# Patient Record
Sex: MALE | Race: WHITE | NOT HISPANIC OR LATINO | URBAN - METROPOLITAN AREA
[De-identification: names, ages, dates, MRNs, and addresses within clinical notes are randomized per-mention and may not be internally consistent; named-entity substitution may affect disease eponyms.]

---

## 2021-03-03 NOTE — IMPRESSION/PLAN
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 Baseline VF 24-2 & photos & OCT done today. Plan: epithelial-myoepithelial carcinoma. Discussed with pt- he has had multiple surgeries on the orbit and skull. has had the tumor removed and biopsied. Will continue to monitor the health of the eye while he is starting radiation down in [place].  Radiation done in April 2022

## 2021-03-03 NOTE — IMPRESSION/PLAN
Impression: Combined forms of age-related cataract, bilateral: H25.813. Plan: moderate cataracts, OD>OS due to multiple surgeries on the orbit due to cancer.

## 2021-07-01 NOTE — IMPRESSION/PLAN
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 Due to it taking up to a 1-2 years for signs to show if any damage to the optic nerve. OD>OS 

VF ordered today- shows stable no changes. Plan: Discussed with pt- he has had multiple surgeries on the orbit and skull. has had the tumor removed and biopsied. Will continue to monitor the health of the eye while he is starting radiation down in [place].  Radiation done in April 2022

## 2021-11-29 NOTE — IMPRESSION/PLAN
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 Due to it taking up to a 1-2 years for signs to show if any damage to the optic nerve. OD>OS. Discussed with pt- he has had multiple surgeries on the orbit and skull. has had the tumor removed and biopsied. Will continue to monitor the health of the eye while he is starting radiation down in [place]. Radiation done in April 2022 Plan: OCT ordered & reviewed- stable, no progression.

## 2022-04-20 NOTE — IMPRESSION/PLAN
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 Due to it taking up to a 1-2 years for signs to show if any damage to the optic nerve. OD>OS. Plan: OCT ordered & reviewed- stable, no progression.

## 2022-04-20 NOTE — IMPRESSION/PLAN
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 Plan: OCT ordered. Discussed this is the reason for the vision loss. Will refer him to retina for more options.

## 2022-04-20 NOTE — IMPRESSION/PLAN
Impression: Macular cyst, hole, or pseudohole, right eye: H35.341. Plan: OCT ordered- new cyst shown in the macula. Discussed with patient in detail.

## 2022-05-03 ENCOUNTER — OFFICE VISIT (OUTPATIENT)
Dept: URBAN - METROPOLITAN AREA CLINIC 68 | Facility: CLINIC | Age: 72
End: 2022-05-03
Payer: MEDICARE

## 2022-05-03 DIAGNOSIS — H25.10 AGE-RELATED NUCLEAR CATARACT, UNSPECIFIED EYE: ICD-10-CM

## 2022-05-03 DIAGNOSIS — H35.341 MACULAR CYST, HOLE, OR PSEUDOHOLE, RIGHT EYE: ICD-10-CM

## 2022-05-03 DIAGNOSIS — H04.121 DRY EYE SYNDROME OF RIGHT LACRIMAL GLAND: ICD-10-CM

## 2022-05-03 PROCEDURE — 92134 CPTRZ OPH DX IMG PST SGM RTA: CPT | Performed by: OPHTHALMOLOGY

## 2022-05-03 PROCEDURE — 99204 OFFICE O/P NEW MOD 45 MIN: CPT | Performed by: OPHTHALMOLOGY

## 2022-05-03 PROCEDURE — 67210 TREATMENT OF RETINAL LESION: CPT | Performed by: OPHTHALMOLOGY

## 2022-05-03 ASSESSMENT — INTRAOCULAR PRESSURE
OS: 15
OD: 14

## 2022-05-03 NOTE — IMPRESSION/PLAN
Impression: h/o Retinal hole, OS
s/p Laser Retinopexy last 05/24/13 DYK Plan: No new symptoms. Exam and OCT reveal no ERM. Good laser treatment at 1 oclock OS. Observe. RDW.

## 2022-05-03 NOTE — IMPRESSION/PLAN
Impression: BRVO, OS. Plan: Exam and OCT reveal CME OS (365 microns). Recommend Avastin series with focal. 
RBA discussed. The patient elects focal OS. Thanks, Jemma Dorado. 

Return in 1 week for Avastin OS, and in 1 month for re-eval CME, OCT OU, IVFA OS 1st

## 2022-05-10 ENCOUNTER — OFFICE VISIT (OUTPATIENT)
Dept: URBAN - METROPOLITAN AREA CLINIC 73 | Facility: CLINIC | Age: 72
End: 2022-05-10
Payer: MEDICARE

## 2022-05-10 DIAGNOSIS — H34.8322 TRIBUTARY (BRANCH) RETINAL VEIN OCCLUSION, LEFT EYE, STABLE: Primary | ICD-10-CM

## 2022-05-10 PROCEDURE — 67028 INJECTION EYE DRUG: CPT | Performed by: OPHTHALMOLOGY

## 2022-05-10 ASSESSMENT — INTRAOCULAR PRESSURE
OS: 14
OD: 18

## 2022-06-21 ENCOUNTER — OFFICE VISIT (OUTPATIENT)
Dept: URBAN - METROPOLITAN AREA CLINIC 73 | Facility: CLINIC | Age: 72
End: 2022-06-21
Payer: MEDICARE

## 2022-06-21 DIAGNOSIS — H35.341 MACULAR CYST, HOLE, OR PSEUDOHOLE, RIGHT EYE: ICD-10-CM

## 2022-06-21 DIAGNOSIS — H34.8322 TRIBUTARY (BRANCH) RETINAL VEIN OCCLUSION, LEFT EYE, STABLE: Primary | ICD-10-CM

## 2022-06-21 DIAGNOSIS — H04.121 DRY EYE SYNDROME OF RIGHT LACRIMAL GLAND: ICD-10-CM

## 2022-06-21 DIAGNOSIS — H25.10 AGE-RELATED NUCLEAR CATARACT, UNSPECIFIED EYE: ICD-10-CM

## 2022-06-21 PROCEDURE — 92134 CPTRZ OPH DX IMG PST SGM RTA: CPT | Performed by: OPHTHALMOLOGY

## 2022-06-21 PROCEDURE — 99024 POSTOP FOLLOW-UP VISIT: CPT | Performed by: OPHTHALMOLOGY

## 2022-06-21 PROCEDURE — 67028 INJECTION EYE DRUG: CPT | Performed by: OPHTHALMOLOGY

## 2022-06-21 ASSESSMENT — INTRAOCULAR PRESSURE
OD: 10
OS: 11

## 2022-06-21 NOTE — IMPRESSION/PLAN
Impression: BRVO, OS.
s/p Avastin  last 05/10/2022
s/p Focal last 05/03/2022 Plan: Exam and OCT reveal CME OS (272 microns, from 365 microns). Recommend Avastin. RBA discussed. The patient elects Avastin today. Thanks, Felicita Paul. 

Return in 1 month for re-eval CME, OCT OU, IVFA OS 1st

## 2022-07-19 NOTE — IMPRESSION/PLAN
Impression: BRVO, OS.
s/p Avastin  last 06/21/2022
s/p Focal last 05/03/2022 Plan: Exam and OCT reveal CME OS (284 microns, from 365 microns). Recommend Avastin. RBA discussed. The patient elects Avastin today. Thanks, Jemma Dorado. 

Return in 1 month for re-eval CME, OCT OU, IVFA OS 1st

## 2022-08-23 NOTE — IMPRESSION/PLAN
Impression: BRVO, OS.
s/p Avastin  last 07/19/2022
s/p Focal last 05/03/2022 Plan: Exam and OCT reveal CME OS (284 microns, from 365 microns). Recommend Avastin. RBA discussed. The patient elects Avastin today. Thanks, Ashley Finders. 

Return in 1 month for re-eval CME, OCT OU, IVFA OS 1st

## 2022-09-20 NOTE — IMPRESSION/PLAN
Impression: BRVO, OS.
s/p Avastin  last 08/23/2022
s/p Focal last 05/03/2022 Plan: Exam and OCT reveal CME OS (315 microns, from 284 microns, from 365 microns). Recommend Avastin series with focal. 
RBA discussed. The patient elects focal today. Thanks, Trevor Simon. 

Return in 1 week for Avastin OS and IVFA OS 1st, and in 1 month for re-eval CME, OCT OU